# Patient Record
(demographics unavailable — no encounter records)

---
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
## 2024-12-11 NOTE — DISCUSSION/SUMMARY
[Low acute suicide risk] : Low acute suicide risk [No] : No [Not clinically indicated] : Safety Plan completed/updated (for individuals at risk): Not clinically indicated [FreeTextEntry1] : Patient denies suicidal/homicidal ideation and/or intent. [FreeTextEntry3] : Patient advised if suicidal/homicidal ideation/intent emerge he should contact [facility] or call [phone] or present to nearest emergency department or urgicenter.
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
## 2024-12-11 NOTE — PHYSICAL EXAM
[Accelerated] : accelerated [Cooperative] : cooperative [Anxious] : anxious [Labile] : labile [Rapid] : rapid [Pressured] : pressured [Overproductive] : overproductive [Tangential] : tangential [Depressed] : depressed [Preoccupations/Ruminations] : preoccupations/ruminations [None Reported] : none reported [WNL] : within normal limits [Average] : average [Mostly blames others for problems] : Mostly blames others for problems [Mild] : mild [3] : 3 - Obviously apprehensive in face and speech [0] : 0 - Absent [1] : 1 - Mild [2] : 2 - Spontaneously indicates feelings of worthlessness [de-identified] : Patient appears and reports to be anxious and depressed.  His speech is rapid and pressured and often tangential requiring frequent interruptions by writer to get patient back on track. He attributes his problems to "the incident." [FreeTextEntry1] : 23

## 2024-12-11 NOTE — PSYCHOSOCIAL ASSESSMENT
[Use within last 30 days] : use within last 30 days [_____] : Frequency: [unfilled] [Yes (select details below)] : Have you ever experienced this type of event? Yes [had nightmares about the event(s) or thought about the event(s) when you did not want] : had nightmares and/or unwanted thoughts about the events [tried hard not to think about the event(s) or went out of your way to avoid situations that reminded you of the event] : tried hard to avoid thinking about events or avoid situations that reminded patient of the event [has been constantly on guard, watchful, or easily startled] : has been constantly on guard, watchful, or easily startled [felt numb or detached from people, activities, or your surrounding] : has felt numb or detached from people, activities, or surroundings [felt guilty or unable to stop blaming yourself or others for the event(s) or any problems the event(s) may have caused] : has felt guilty or unable to stop blaming self or others for event(s), or any problems the event(s) may have caused [Competitive and integrated employment] : Competitive and integrated employment [15 - 34 hours] : 15 - 34 hours [Financially stable] : financially stable [None] : none [Client's parents (biological, adoptive, stepparent)] : client's parents (biological, adoptive, stepparent) [Yes] : yes [N/A] : n/a [Mother] : mother [Father] : father [Lives with Family Member] : lives with family member [Sibling] : sibling [Good] : good [Frequent Contact] : frequent contact [Yes (add details)] : Patient has/had legal or forensic involvement? Yes [Unknown whether or not the client has a criminal justice or juvenile justice status] : Unknown whether or not the client has a criminal justice or juvenile justice status [No] : No [FreeTextEntry2] : Patient's mother-Aleah Patient's father-Sridhar Patient's brother-Bam all are a source of strength and support. 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

## 2024-12-11 NOTE — DISCUSSION/SUMMARY
[Low acute suicide risk] : Low acute suicide risk [No] : No [Not clinically indicated] : Safety Plan completed/updated (for individuals at risk): Not clinically indicated [FreeTextEntry1] : Patient denies suicidal/homicidal ideation and/or intent. [FreeTextEntry3] : Patient advised if suicidal/homicidal ideation/intent emerge he should contact [facility] or call [phone] or present to nearest emergency department or urgicenter.

## 2024-12-11 NOTE — PLAN
[Admit to Program     (Add Program Admission information to a new column in the Admit/Discharge Flowsheet)] : Admit to program [Every ___ week(s)] : Psychotherapy: Every [unfilled] week(s) [Individual Therapy] : Individual Therapy [FreeTextEntry4] : Treatment Plan for Anxiety Goal: Reduce overall frequency, intensity, and duration of the anxiety (excessive worry, motor tension, rituals, obsessive rumination, autonomic hyperactivity and/or hypervigilance) so that daily functioning is not impaired. Objectives: 1-Describe situations, thoughts, feelings and actions associated with anxiety and worry. 2-Complete psychological instruments designed to assess worry and anxiety symptoms with a focus on reducing obtained scores by 20% or more. 3-Participate in gradual imaginal and/or live exposure to the feared negative consequences predicted by worries and develop reality-based predictions. Methods/Interventions: 1-Ask the patient to describe past experiences of anxiety and the impact of these experiences on functioning via interview. 2-Administer psychological objective measures to help assess the nature and degree of the patient's worry and anxiety and impact on functioning. 3-Direct and assist the patient in constructing a hierarchy of feared or avoided activities and/or thoughts followed by imaginal and/or in vivo exposure to feared situations and/or thoughts.  Treatment Plan for Depression: Goal: Develop healthy thinking patterns and beliefs about self, others and the world that lead to alleviation of depressive symptoms and help prevent relapse of depression. Objectives: 1- Verbalize any history of past and present suicidal ideation and/or intent. 2- Identify and replace thoughts and beliefs that support depression 3- Complete psychological instruments designed to assess depressive symptoms with a focus on reducing obtained scores by 20% or more. Methods: 1-Establish rapport with the patient toward building a strong therapeutic alliance, convey caring, support, warmth and empathy in a safe, non-judgmental environment. 2-Conduct cognitive behavioral therapy, first conveying the connection among cognition, depressive feelings and actions. 3- Administer psychological objective measures to help assess the nature and degree of the patient's depression and impact on functioning. PTSD Goal Eliminate or reduce the negative impact trauma- related symptoms have on social, occupational, and family functioning.  Objectives:  1-Describe in as much detail as comfort allows the experience( s) with trauma and history of PTSD symptoms.  2-Identify, challenge, and replace biased, negative, and self- defeating thoughts resulting from the trauma with reality- based alternatives.  3-Learn and implement guided self- dialog to manage thoughts, feelings, and urges brought on by encounters with trauma- related situations.   Methods:  1-Gently and sensitively explore the client's recollection of the facts of the traumatic incident( s) and his/ her/ their posttraumatic reactions; assess frequency, intensity, duration, and history of the client's PTSD symptoms and their impact on functioning.  2-Assign the client to keep a daily log of automatic thoughts and feelings (or supplement with "Negative Thoughts Trigger Negative Feelings" in the Adult Psychotherapy Homework Planner by Joseph); process the journal material to identify trauma- driven patterns and reality- based alternatives facilitative of posttraumatic growth.  3-Teach the client a guided self- dialogue procedure in which he/ she/ they learn to recognize maladaptive self- talk, challenge its biases, cope with engendered feelings, overcome avoidance, and reinforce accomplishments; review and reinforce progress, problem- solve obstacles toward a sustained goal- oriented approach to daily activities

## 2024-12-11 NOTE — SOCIAL HISTORY
[FreeTextEntry1] : Patient reports that his passion is acting. He loves singing and playing guitar and piano and has recorded 4 albums.  He also enjoys collecting vintage clothing.

## 2024-12-11 NOTE — PAST MEDICAL HISTORY
[FreeTextEntry1] : Patient Dx scoliosis treated with heel insert Dx. Hashimoto's History of infant thumb surgery

## 2024-12-11 NOTE — PHYSICAL EXAM
[Accelerated] : accelerated [Cooperative] : cooperative [Anxious] : anxious [Labile] : labile [Rapid] : rapid [Pressured] : pressured [Overproductive] : overproductive [Tangential] : tangential [Depressed] : depressed [Preoccupations/Ruminations] : preoccupations/ruminations [None Reported] : none reported [WNL] : within normal limits [Average] : average [Mostly blames others for problems] : Mostly blames others for problems [Mild] : mild [3] : 3 - Obviously apprehensive in face and speech [0] : 0 - Absent [1] : 1 - Mild [2] : 2 - Spontaneously indicates feelings of worthlessness [de-identified] : Patient appears and reports to be anxious and depressed.  His speech is rapid and pressured and often tangential requiring frequent interruptions by writer to get patient back on track. He attributes his problems to "the incident." [FreeTextEntry1] : 23

## 2024-12-11 NOTE — PSYCHOSOCIAL ASSESSMENT
[Use within last 30 days] : use within last 30 days [_____] : Frequency: [unfilled] [Yes (select details below)] : Have you ever experienced this type of event? Yes [had nightmares about the event(s) or thought about the event(s) when you did not want] : had nightmares and/or unwanted thoughts about the events [tried hard not to think about the event(s) or went out of your way to avoid situations that reminded you of the event] : tried hard to avoid thinking about events or avoid situations that reminded patient of the event [has been constantly on guard, watchful, or easily startled] : has been constantly on guard, watchful, or easily startled [felt numb or detached from people, activities, or your surrounding] : has felt numb or detached from people, activities, or surroundings [felt guilty or unable to stop blaming yourself or others for the event(s) or any problems the event(s) may have caused] : has felt guilty or unable to stop blaming self or others for event(s), or any problems the event(s) may have caused [Competitive and integrated employment] : Competitive and integrated employment [15 - 34 hours] : 15 - 34 hours [Financially stable] : financially stable [None] : none [Client's parents (biological, adoptive, stepparent)] : client's parents (biological, adoptive, stepparent) [Yes] : yes [N/A] : n/a [Mother] : mother [Father] : father [Lives with Family Member] : lives with family member [Sibling] : sibling [Good] : good [Frequent Contact] : frequent contact [Yes (add details)] : Patient has/had legal or forensic involvement? Yes [Unknown whether or not the client has a criminal justice or juvenile justice status] : Unknown whether or not the client has a criminal justice or juvenile justice status [No] : No [FreeTextEntry2] : Patient's mother-Aleah Patient's father-Sridhar Patient's brother-Bam all are a source of strength and support. 

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
## 2024-12-21 NOTE — PLAN
[Cognitive Processing Therapy] : Cognitive Processing Therapy  [FreeTextEntry2] : Treatment Plan for Anxiety Goal: Reduce overall frequency, intensity, and duration of the anxiety (excessive worry, motor tension, rituals, obsessive rumination, autonomic hyperactivity and/or hypervigilance) so that daily functioning is not impaired. Objectives: 1-Describe situations, thoughts, feelings and actions associated with anxiety and worry. 2-Complete psychological instruments designed to assess worry and anxiety symptoms with a focus on reducing obtained scores by 20% or more. 3-Participate in gradual imaginal and/or live exposure to the feared negative consequences predicted by worries and develop reality-based predictions. Methods/Interventions: 1-Ask the patient to describe past experiences of anxiety and the impact of these experiences on functioning via interview. 2-Administer psychological objective measures to help assess the nature and degree of the patient's worry and anxiety and impact on functioning. 3-Direct and assist the patient in constructing a hierarchy of feared or avoided activities and/or thoughts followed by imaginal and/or in vivo exposure to feared situations and/or thoughts.  Treatment Plan for Depression: Goal: Develop healthy thinking patterns and beliefs about self, others and the world that lead to alleviation of depressive symptoms and help prevent relapse of depression. Objectives: 1- Verbalize any history of past and present suicidal ideation and/or intent. 2- Identify and replace thoughts and beliefs that support depression 3- Complete psychological instruments designed to assess depressive symptoms with a focus on reducing obtained scores by 20% or more. Methods: 1-Establish rapport with the patient toward building a strong therapeutic alliance, convey caring, support, warmth and empathy in a safe, non-judgmental environment. 2-Conduct cognitive behavioral therapy, first conveying the connection among cognition, depressive feelings and actions. 3- Administer psychological objective measures to help assess the nature and degree of the patient's depression and impact on functioning. PTSD Goal Eliminate or reduce the negative impact trauma- related symptoms have on social, occupational, and family functioning.  Objectives:  1-Describe in as much detail as comfort allows the experience( s) with trauma and history of PTSD symptoms.  2-Identify, challenge, and replace biased, negative, and self- defeating thoughts resulting from the trauma with reality- based alternatives.  3-Learn and implement guided self- dialog to manage thoughts, feelings, and urges brought on by encounters with trauma- related situations.  Methods:  1-Gently and sensitively explore the client's recollection of the facts of the traumatic incident( s) and his/ her/ their posttraumatic reactions; assess frequency, intensity, duration, and history of the client's PTSD symptoms and their impact on functioning.  2-Assign the client to keep a daily log of automatic thoughts and feelings (or supplement with "Negative Thoughts Trigger Negative Feelings" in the Adult Psychotherapy Homework Planner by Joseph); process the journal material to identify trauma- driven patterns and reality- based alternatives facilitative of posttraumatic growth.  3-Teach the client a guided self- dialogue procedure in which he/ she/ they learn to recognize maladaptive self- talk, challenge its biases, cope with engendered feelings, overcome avoidance, and reinforce accomplishments; review and reinforce progress, problem- solve obstacles toward a sustained goal- oriented approach to daily activities Psychotherapy: Every 1 week(s)   Types: Individual Therapy [de-identified] : Patient seen in session.  requested tabling discussion of "event" until next session given the fact that patient has final exams after session that he feels and writer agrees would be negatively impacted by revisiting "event."  Patient complained of neck and muscle pain throughout the session causing him to contort in all directions throughout the session.  Patient tangential with AMARILIS and writer had to frequently interrupt to redirect.  Majority of session spent creating patient's genogram.  Genogram revealed familial issues "my father tolerates me," "he doesn't enjoy fun" and "He's always about business."  The patient's parents are together 25+ years and the patient describes their marriage as "dull."  He sees his mother as a "fixer" who is not compassionate.  He feels she has undiagnosed/untreated anxiety and depression.  The patient describes his brother as his "safe person." He feels his borther is also untreated/undiagnosed depression and anxiety. [FreeTextEntry1] : Treatment Plan for Anxiety Goal: Reduce overall frequency, intensity, and duration of the anxiety (excessive worry, motor tension, rituals, obsessive rumination, autonomic hyperactivity and/or hypervigilance) so that daily functioning is not impaired. Objectives: 1-Describe situations, thoughts, feelings and actions associated with anxiety and worry. 2-Complete psychological instruments designed to assess worry and anxiety symptoms with a focus on reducing obtained scores by 20% or more. 3-Participate in gradual imaginal and/or live exposure to the feared negative consequences predicted by worries and develop reality-based predictions. Methods/Interventions: 1-Ask the patient to describe past experiences of anxiety and the impact of these experiences on functioning via interview. 2-Administer psychological objective measures to help assess the nature and degree of the patient's worry and anxiety and impact on functioning. 3-Direct and assist the patient in constructing a hierarchy of feared or avoided activities and/or thoughts followed by imaginal and/or in vivo exposure to feared situations and/or thoughts.  Treatment Plan for Depression: Goal: Develop healthy thinking patterns and beliefs about self, others and the world that lead to alleviation of depressive symptoms and help prevent relapse of depression. Objectives: 1- Verbalize any history of past and present suicidal ideation and/or intent. 2- Identify and replace thoughts and beliefs that support depression 3- Complete psychological instruments designed to assess depressive symptoms with a focus on reducing obtained scores by 20% or more. Methods: 1-Establish rapport with the patient toward building a strong therapeutic alliance, convey caring, support, warmth and empathy in a safe, non-judgmental environment. 2-Conduct cognitive behavioral therapy, first conveying the connection among cognition, depressive feelings and actions. 3- Administer psychological objective measures to help assess the nature and degree of the patient's depression and impact on functioning. PTSD Goal Eliminate or reduce the negative impact trauma- related symptoms have on social, occupational, and family functioning.  Objectives:  1-Describe in as much detail as comfort allows the experience( s) with trauma and history of PTSD symptoms.  2-Identify, challenge, and replace biased, negative, and self- defeating thoughts resulting from the trauma with reality- based alternatives.  3-Learn and implement guided self- dialog to manage thoughts, feelings, and urges brought on by encounters with trauma- related situations.  Methods:  1-Gently and sensitively explore the client's recollection of the facts of the traumatic incident( s) and his/ her/ their posttraumatic reactions; assess frequency, intensity, duration, and history of the client's PTSD symptoms and their impact on functioning.  2-Assign the client to keep a daily log of automatic thoughts and feelings (or supplement with "Negative Thoughts Trigger Negative Feelings" in the Adult Psychotherapy Homework Planner by Joseph); process the journal material to identify trauma- driven patterns and reality- based alternatives facilitative of posttraumatic growth.  3-Teach the client a guided self- dialogue procedure in which he/ she/ they learn to recognize maladaptive self- talk, challenge its biases, cope with engendered feelings, overcome avoidance, and reinforce accomplishments; review and reinforce progress, problem- solve obstacles toward a sustained goal- oriented approach to daily activities Psychotherapy: Every 1 week(s)   Types: Individual Therapy

## 2024-12-21 NOTE — END OF VISIT
[Duration of Psychotherapy Visit (minutes spent in synchronous communication): ____] : Duration of Psychotherapy Visit (minutes spent in synchronous communication): [unfilled] [Individual Psychotherapy for 38-52 minutes] : Individual Psychotherapy for 38 - 52 minutes [Teletherapy Service Provided] : The services provided in this session were delivered via tele-therapy [FreeTextEntry3] : Home [FreeTextEntry5] : [place], [place] [Licensed Clinician] : Licensed Clinician

## 2024-12-21 NOTE — RISK ASSESSMENT
[No, patient denies ideation or behavior] : No, patient denies ideation or behavior [Low acute suicide risk] : Low acute suicide risk [Not clinically indicated] : Safety Plan completed/updated (for individuals at risk): Not clinically indicated [FreeTextEntry9] : patient presents with low risk for suicidal/homicidal ideation and/or intent. [FreeTextEntry3] : Patient advised that should thoughts of suicide or aggression emerge he should contact [facility], call [phone] or present to nearest ED or urgicenter

## 2024-12-21 NOTE — REASON FOR VISIT
[Patient preference] : as per patient preference [Telehealth (audio & video) - Individual/Group] : This visit was provided via telehealth using real-time 2-way audio visual technology. [Medical Office: ([facility])___] : The provider was located at the medical office in [unfilled]. [Home] : The patient, [unfilled], was located at home, [unfilled], at the time of the visit. [Patient] : Patient [FreeTextEntry4] : 9:00 AM [FreeTextEntry5] : 9:45 AM [FreeTextEntry1] :  Patient reports that he is experiencing symptoms consistent with a Dx of PTSD including, but not limited to, re-experiencing, avoidance, panic, hypervigilance, transient paranoia, and depression. Previously the patient was diagnosed with what he refers to as "raging ADHD."

## 2024-12-21 NOTE — PHYSICAL EXAM
[Unkept] : unkept [Avoidant] : avoidant [Accelerated] : accelerated [Stereotyped/Peculiar] : stereotyped/peculiar [Cooperative] : cooperative [Depressed] : depressed [Anxious] : anxious [Rapid] : rapid [Overproductive] : overproductive [Tangential] : tangential [Flight of ideas] : flight of ideas [None] : none [None Reported] : none reported [Average] : average [WNL] : within normal limits [3] : 3 - Moderate [2] : Rated by History: 2 - Definite weight loss [0] : 0 - Absent [1] : 1 - Indicates feelings of worthlessness (loss of self-esteem) only on questioning [de-identified] : Patient present with rapid, tangential speech along with reported and observed anxious and depressive symptoms/ [FreeTextEntry1] : 23

## 2025-02-03 NOTE — REASON FOR VISIT
[Medical Office: ([facility])___] : The provider was located at the medical office in [unfilled]. [Other Location: e.g. Home (Enter Location, City,State)___] : The patient, [unfilled], was located at [unfilled] at the time of the visit. [Patient] : Patient [FreeTextEntry4] : 11:00 AM [FreeTextEntry5] : 11:55 AM [FreeTextEntry1] :  Patient reports that he is experiencing symptoms consistent with a Dx of PTSD including, but not limited to, re-experiencing, avoidance, panic, hypervigilance, transient paranoia, and depression. Previously the patient was diagnosed with what he refers to as "raging ADHD."
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
## 2025-02-03 NOTE — PHYSICAL EXAM
[Unkept] : unkept [Accelerated] : accelerated [Stereotyped/Peculiar] : stereotyped/peculiar [Cooperative] : cooperative [Depressed] : depressed [Anxious] : anxious [Rapid] : rapid [Overproductive] : overproductive [Tangential] : tangential [Flight of ideas] : flight of ideas [None] : none [None Reported] : none reported [WNL] : within normal limits [3] : 3 - Moderate [2] : Rated by History: 2 - Definite weight loss [Average] : average [1] : 1 - Mildly preoccupied with bodily functions and physical symptoms [0] : 0 - Not present [de-identified] : Patient present with rapid, tangential speech along with reported and observed anxious and depressed mood. [FreeTextEntry1] : 18

## 2025-02-03 NOTE — END OF VISIT
[Duration of Psychotherapy Visit (minutes spent in synchronous communication): ____] : Duration of Psychotherapy Visit (minutes spent in synchronous communication): [unfilled] [Individual Psychotherapy for 53+ minutes] : Individual Psychotherapy for 53+ minutes  [FreeTextEntry3] : MEET [FreeTextEntry5] : MEET [Licensed Clinician] : Licensed Clinician
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
## 2025-02-03 NOTE — RISK ASSESSMENT
[No, patient denies ideation or behavior] : No, patient denies ideation or behavior [Low acute suicide risk] : Low acute suicide risk [Not clinically indicated] : Safety Plan completed/updated (for individuals at risk): Not clinically indicated [FreeTextEntry9] : patient presents with low risk for suicidal/homicidal ideation and/or intent. [FreeTextEntry3] : Patient advised that should thoughts of suicide or aggression emerge he should contact [facility], call [phone] or present to nearest ED or urgicenter

## 2025-02-04 NOTE — END OF VISIT
[FreeTextEntry3] : I, Dr. Moulton personally performed the evaluation and management (E/M) services including all necessary procedures, for this new patient. That E/M includes conducting the clinically appropriate initial history &/or exam, assessing all conditions, and establishing the plan of care. Today, my PATRIC, Eliana Jain, was here to observe &/or participate in the visit & follow plan of care established by me.

## 2025-02-04 NOTE — ASSESSMENT
[FreeTextEntry1] : Patient 22-year-old placed tubes in his ears as a child comes in with history of recent sialoadenitis treated with antibiotics and drained a lot of mucus from his sinuses which complains of frontal headaches as well.  He also has he is micrognathia can has some TMJ related issues for which I referred him to one of our oral oral maxillofacial surgeons for an evaluation because of his sinus issues we sent her for a CAT scan to definitively evaluate his sinuses determine if any additional intervention will be indicated endoscopically he has a mildly deviated septum but no evidence of any tumors masses or polyps are present.  He will continue to suck on lemon drops and stay hydrated to him minimize the chance of recurrent parotitis.

## 2025-02-04 NOTE — HISTORY OF PRESENT ILLNESS
[de-identified] : Patient comes in with reported TMJ and recent parotitis that he thinks is causing facial pain and pressure. He had swelling in his face and a lot of pain in the cheeks since. He has been on antibiotics for the parotitis, currently on 10th day. However, he is having worsening of facial pain and tightness. He feels facial pain in the cheeks, radiating from the jaw all day for the last few weeks.

## 2025-02-04 NOTE — CONSULT LETTER
.Refill Request     CONFIRM preferred pharmacy with the patient. If Mail Order Rx - Pend for 90 day refill. Last Seen: Last Seen Department: 5/1/2023  Last Seen by PCP: 5/1/2023    Last Written: 5/1/23 180 with 1    If no future appointment scheduled:  Review the last OV with PCP and review information for follow-up visit,  Route STAFF MESSAGE with patient name to the [facility] for scheduling with the following information:            -  Timing of next visit           -  Visit type ie Physical, OV, etc           -  Diagnoses/Reason ie. COPD, HTN - Do not use MEDICATION, Follow-up or CHECK UP - Give reason for visit      Next Appointment:   Future Appointments   Date Time Provider Mariana Brito   8/1/2023  3:30 PM Aston Mejias MD [facility] [place]       Message sent to [employer] to schedule appt with patient?   N/A      Requested Prescriptions     Pending Prescriptions Disp Refills    gabapentin (NEURONTIN) 100 MG capsule [Pharmacy Med Name: Gabapentin 100 MG Oral Capsule] 180 capsule 0     Sig: TAKE 2 CAPSULES BY MOUTH THREE TIMES DAILY [Dear  ___] : Dear  [unfilled], [Consult Letter:] : I had the pleasure of evaluating your patient, [unfilled]. [Please see my note below.] : Please see my note below. [Consult Closing:] : Thank you very much for allowing me to participate in the care of this patient.  If you have any questions, please do not hesitate to contact me. [Sincerely,] : Sincerely, [FreeTextEntry3] : Josse Moulton MD [facility] Associated Professor, [place]

## 2025-02-06 NOTE — PLAN
[Medication education provided] : Medication education provided. [FreeTextEntry5] : No acute safety concerns. Continue current medications: Vyvanze 60mg daily  Remeron 30mg qHS propranolol 20mg daily prn anxiety Continue individual psychotherapy. Continue psych follow up at [facility]. Will discuss with team re: appropriate long term psych follow up care.

## 2025-02-06 NOTE — END OF VISIT
[Duration of Psychotherapy Visit (minutes spent in synchronous communication): ____] : Duration of Psychotherapy Visit (minutes spent in synchronous communication): [unfilled] [Individual Psychotherapy for 38-52 minutes] : Individual Psychotherapy for 38 - 52 minutes [Teletherapy Service Provided] : The services provided in this session were delivered via tele-therapy [FreeTextEntry3] : Home [FreeTextEntry5] : [place], [place] [Licensed Clinician] : Licensed Clinician
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
## 2025-02-06 NOTE — RISK ASSESSMENT
[No, patient denies ideation or behavior] : No, patient denies ideation or behavior [Low acute suicide risk] : Low acute suicide risk [Not clinically indicated] : Safety Plan completed/updated (for individuals at risk): Not clinically indicated [FreeTextEntry9] : patient presents with low risk for suicidal/homicidal ideation and/or intent. [FreeTextEntry3] : Patient advised that should thoughts of suicide or aggression emerge he should contact [facility], call [phone] or present to nearest ED or urgicenter

## 2025-02-06 NOTE — REASON FOR VISIT
[FreeTextEntry1] : Looking to transfer psych care to [facility]

## 2025-02-06 NOTE — SOCIAL HISTORY
[FreeTextEntry1] : Single. No children. Lives with parents. Finished high school. In college at [employer] for bachelors in Criminal Justice. 

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
## 2025-02-06 NOTE — REASON FOR VISIT
[Patient preference] : as per patient preference [Telehealth (audio & video) - Individual/Group] : This visit was provided via telehealth using real-time 2-way audio visual technology. [Other Location: e.g. Home (Enter Location, City,State)___] : The provider was located at [unfilled]. [Home] : The patient, [unfilled], was located at home, [unfilled], at the time of the visit. [Patient] : Patient [FreeTextEntry4] : 3:00 PM [FreeTextEntry5] : 3:45 PM [FreeTextEntry1] :  Patient reports that he is experiencing symptoms consistent with a Dx of PTSD including, but not limited to, re-experiencing, avoidance, panic, hypervigilance, transient paranoia, and depression. Previously the patient was diagnosed with what he refers to as "raging ADHD."

## 2025-02-06 NOTE — PHYSICAL EXAM
[Unkept] : unkept [Accelerated] : accelerated [Stereotyped/Peculiar] : stereotyped/peculiar [Cooperative] : cooperative [Depressed] : depressed [Anxious] : anxious [Rapid] : rapid [Overproductive] : overproductive [Tangential] : tangential [Flight of ideas] : flight of ideas [None] : none [None Reported] : none reported [Average] : average [WNL] : within normal limits [3] : 3 - Obviously apprehensive in face and speech [0] : 0 - Not present [Full] : full [de-identified] : Patient present with rapid, tangential speech along with reported and observed anxious and depressed mood. [1] : 1 - Fidgety. Clenching fists or chair arm, kicking feet [2] : 2 - Mild [FreeTextEntry1] : 14
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
## 2025-02-15 NOTE — RISK ASSESSMENT
[No, patient denies ideation or behavior] : No, patient denies ideation or behavior [Low acute suicide risk] : Low acute suicide risk [Not clinically indicated] : Safety Plan completed/updated (for individuals at risk): Not clinically indicated [FreeTextEntry9] : patient presents with low risk for suicidal/homicidal ideation and/or intent. [FreeTextEntry3] : Patient advised that should thoughts of suicide or aggression emerge he should contact [facility], call [phone] or present to nearest ED or urgicenter
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
## 2025-02-15 NOTE — REASON FOR VISIT
[Medical Office: ([facility])___] : The provider was located at the medical office in [unfilled]. [Other Location: e.g. Home (Enter Location, City,State)___] : The patient, [unfilled], was located at [unfilled] at the time of the visit. [Patient] : Patient [FreeTextEntry4] : 3:00 PM [FreeTextEntry5] : 3:55 PM [FreeTextEntry1] :  Patient reports that he is experiencing symptoms consistent with a Dx of PTSD including, but not limited to, re-experiencing, avoidance, panic, hypervigilance, transient paranoia, and depression. Previously the patient was diagnosed with what he refers to as "raging ADHD."
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
## 2025-02-15 NOTE — PHYSICAL EXAM
[Unkept] : unkept [Accelerated] : accelerated [Stereotyped/Peculiar] : stereotyped/peculiar [Cooperative] : cooperative [Depressed] : depressed [Anxious] : anxious [Full] : full [Rapid] : rapid [Overproductive] : overproductive [Tangential] : tangential [Flight of ideas] : flight of ideas [None] : none [None Reported] : none reported [Average] : average [WNL] : within normal limits [1] : 1 - Mildly preoccupied with bodily functions and physical symptoms [0] : 0 - Not present [de-identified] : Patient present with rapid, tangential speech along with reported and observed anxious and depressed mood. [2] : 2 - Spontaneously expresses discomfort, worries over trivia [FreeTextEntry1] : 14
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
## 2025-03-03 NOTE — REASON FOR VISIT
[Patient preference] : as per patient preference [Telehealth (audio & video) - Individual/Group] : This visit was provided via telehealth using real-time 2-way audio visual technology. [Medical Office: ([facility])___] : The provider was located at the medical office in [unfilled]. [Home] : The patient, [unfilled], was located at home, [unfilled], at the time of the visit. [Patient] : Patient [FreeTextEntry4] : 1:00 PM [FreeTextEntry5] : 1:45 PM [FreeTextEntry1] :  Patient reports that he is experiencing symptoms consistent with a Dx of PTSD including, but not limited to, re-experiencing, avoidance, panic, hypervigilance, transient paranoia, and depression. Previously the patient was diagnosed with what he refers to as "raging ADHD."

## 2025-03-03 NOTE — RISK ASSESSMENT
[No, patient denies ideation or behavior] : No, patient denies ideation or behavior [Low acute suicide risk] : Low acute suicide risk [Not clinically indicated] : Safety Plan completed/updated (for individuals at risk): Not clinically indicated [FreeTextEntry9] : patient presents with low risk for suicidal/homicidal ideation and/or intent. [FreeTextEntry3] : Patient advised that should thoughts of suicide or aggression emerge he should contact [facility], call [phone] or present to nearest ED or urgicenter

## 2025-03-03 NOTE — PHYSICAL EXAM
[Unkept] : unkept [Stereotyped/Peculiar] : stereotyped/peculiar [Cooperative] : cooperative [Depressed] : depressed [Anxious] : anxious [Constricted] : constricted [Rapid] : rapid [Overproductive] : overproductive [Tangential] : tangential [Flight of ideas] : flight of ideas [None] : none [None Reported] : none reported [Average] : average [WNL] : within normal limits [3] : 3 - Obviously apprehensive in face and speech [1] : 1 - Eats spontaneously but without relish or pleasure [2] : Rated by History: 2 - Definite weight loss [0] : 0 - Not present [de-identified] : Patient present with rapid, tangential speech along with reported and observed anxious and depressed mood. [FreeTextEntry1] : 17

## 2025-03-03 NOTE — END OF VISIT
[Duration of Psychotherapy Visit (minutes spent in synchronous communication): ____] : Duration of Psychotherapy Visit (minutes spent in synchronous communication): [unfilled] [Individual Psychotherapy for 38-52 minutes] : Individual Psychotherapy for 38 - 52 minutes [Teletherapy Service Provided] : The services provided in this session were delivered via tele-therapy [FreeTextEntry3] : Home [FreeTextEntry5] : MEET [Licensed Clinician] : Licensed Clinician

## 2025-03-07 NOTE — RISK ASSESSMENT
[No, patient denies ideation or behavior] : No, patient denies ideation or behavior [Low acute suicide risk] : Low acute suicide risk [Not clinically indicated] : Safety Plan completed/updated (for individuals at risk): Not clinically indicated [FreeTextEntry9] : patient presents with low risk for suicidal/homicidal ideation and/or intent. [FreeTextEntry3] : Patient advised that should thoughts of suicide or aggression emerge he should contact [facility], call [phone] or present to nearest ED or urgicenter

## 2025-03-07 NOTE — PLAN
[Recommended Frequency of Visits: ____] : Recommended frequency of visits: [unfilled] [Return in ____ week(s)] : Return in [unfilled] week(s) [FreeTextEntry2] : Treatment Plan for Anxiety Goal: Reduce overall frequency, intensity, and duration of the anxiety (excessive worry, motor tension, rituals, obsessive rumination, autonomic hyperactivity and/or hypervigilance) so that daily functioning is not impaired. Objectives: 1-Describe situations, thoughts, feelings and actions associated with anxiety and worry. 2-Complete psychological instruments designed to assess worry and anxiety symptoms with a focus on reducing obtained scores by 20% or more. 3-Participate in gradual imaginal and/or live exposure to the feared negative consequences predicted by worries and develop reality-based predictions. Methods/Interventions: 1-Ask the patient to describe past experiences of anxiety and the impact of these experiences on functioning via interview. 2-Administer psychological objective measures to help assess the nature and degree of the patient's worry and anxiety and impact on functioning. 3-Direct and assist the patient in constructing a hierarchy of feared or avoided activities and/or thoughts followed by imaginal and/or in vivo exposure to feared situations and/or thoughts.  Treatment Plan for Depression: Goal: Develop healthy thinking patterns and beliefs about self, others and the world that lead to alleviation of depressive symptoms and help prevent relapse of depression. Objectives: 1- Verbalize any history of past and present suicidal ideation and/or intent. 2- Identify and replace thoughts and beliefs that support depression 3- Complete psychological instruments designed to assess depressive symptoms with a focus on reducing obtained scores by 20% or more. Methods: 1-Establish rapport with the patient toward building a strong therapeutic alliance, convey caring, support, warmth and empathy in a safe, non-judgmental environment. 2-Conduct cognitive behavioral therapy, first conveying the connection among cognition, depressive feelings and actions. 3- Administer psychological objective measures to help assess the nature and degree of the patient's depression and impact on functioning. PTSD Goal Eliminate or reduce the negative impact trauma- related symptoms have on social, occupational, and family functioning.  Objectives:  1-Describe in as much detail as comfort allows the experience( s) with trauma and history of PTSD symptoms.  2-Identify, challenge, and replace biased, negative, and self- defeating thoughts resulting from the trauma with reality- based alternatives.  3-Learn and implement guided self- dialog to manage thoughts, feelings, and urges brought on by encounters with trauma- related situations.  Methods:  1-Gently and sensitively explore the client's recollection of the facts of the traumatic incident( s) and his/ her/ their posttraumatic reactions; assess frequency, intensity, duration, and history of the client's PTSD symptoms and their impact on functioning.  2-Assign the client to keep a daily log of automatic thoughts and feelings (or supplement with "Negative Thoughts Trigger Negative Feelings" in the Adult Psychotherapy Homework Planner by Joseph); process the journal material to identify trauma- driven patterns and reality- based alternatives facilitative of posttraumatic growth.  3-Teach the client a guided self- dialogue procedure in which he/ she/ they learn to recognize maladaptive self- talk, challenge its biases, cope with engendered feelings, overcome avoidance, and reinforce accomplishments; review and reinforce progress, problem- solve obstacles toward a sustained goal- oriented approach to daily activities Psychotherapy: Every 1 week(s)   Types: Individual Therapy [Cognitive and/or Behavior Therapy] : Cognitive and/or Behavior Therapy  [Cognitive Processing Therapy] : Cognitive Processing Therapy  [de-identified] : Patient seen in session.  Reports decrease in Remeron toward tapering off and resulting decrease in pain and anxiety.  However, he reports a simultaneous increase in depression.  Results of Genesight testing revealed limited efficacy of SSRI's. He will continue with Vyvanse and Inderal.  Patient acknowledged belief he is " on the spectrum" as he experiences texture, and other sensitivities including symmetry.  He continues with somatic complaints and contortions and repots he is starting today with a "somatic therapist." Patient discussed being overanalytical as well as hypersensitive. [FreeTextEntry1] : Treatment Plan for Anxiety Goal: Reduce overall frequency, intensity, and duration of the anxiety (excessive worry, motor tension, rituals, obsessive rumination, autonomic hyperactivity and/or hypervigilance) so that daily functioning is not impaired. Objectives: 1-Describe situations, thoughts, feelings and actions associated with anxiety and worry. 2-Complete psychological instruments designed to assess worry and anxiety symptoms with a focus on reducing obtained scores by 20% or more. 3-Participate in gradual imaginal and/or live exposure to the feared negative consequences predicted by worries and develop reality-based predictions. Methods/Interventions: 1-Ask the patient to describe past experiences of anxiety and the impact of these experiences on functioning via interview. 2-Administer psychological objective measures to help assess the nature and degree of the patient's worry and anxiety and impact on functioning. 3-Direct and assist the patient in constructing a hierarchy of feared or avoided activities and/or thoughts followed by imaginal and/or in vivo exposure to feared situations and/or thoughts.  Treatment Plan for Depression: Goal: Develop healthy thinking patterns and beliefs about self, others and the world that lead to alleviation of depressive symptoms and help prevent relapse of depression. Objectives: 1- Verbalize any history of past and present suicidal ideation and/or intent. 2- Identify and replace thoughts and beliefs that support depression 3- Complete psychological instruments designed to assess depressive symptoms with a focus on reducing obtained scores by 20% or more. Methods: 1-Establish rapport with the patient toward building a strong therapeutic alliance, convey caring, support, warmth and empathy in a safe, non-judgmental environment. 2-Conduct cognitive behavioral therapy, first conveying the connection among cognition, depressive feelings and actions. 3- Administer psychological objective measures to help assess the nature and degree of the patient's depression and impact on functioning. PTSD Goal Eliminate or reduce the negative impact trauma- related symptoms have on social, occupational, and family functioning.  Objectives:  1-Describe in as much detail as comfort allows the experience( s) with trauma and history of PTSD symptoms.  2-Identify, challenge, and replace biased, negative, and self- defeating thoughts resulting from the trauma with reality- based alternatives.  3-Learn and implement guided self- dialog to manage thoughts, feelings, and urges brought on by encounters with trauma- related situations.  Methods:  1-Gently and sensitively explore the client's recollection of the facts of the traumatic incident( s) and his/ her/ their posttraumatic reactions; assess frequency, intensity, duration, and history of the client's PTSD symptoms and their impact on functioning.  2-Assign the client to keep a daily log of automatic thoughts and feelings (or supplement with "Negative Thoughts Trigger Negative Feelings" in the Adult Psychotherapy Homework Planner by Joseph); process the journal material to identify trauma- driven patterns and reality- based alternatives facilitative of posttraumatic growth.  3-Teach the client a guided self- dialogue procedure in which he/ she/ they learn to recognize maladaptive self- talk, challenge its biases, cope with engendered feelings, overcome avoidance, and reinforce accomplishments; review and reinforce progress, problem- solve obstacles toward a sustained goal- oriented approach to daily activities Psychotherapy: Every 1 week(s)   Types: Individual Therapy
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
## 2025-03-07 NOTE — REASON FOR VISIT
[Patient preference] : as per patient preference [Telehealth (audio & video) - Individual/Group] : This visit was provided via telehealth using real-time 2-way audio visual technology. [Medical Office: ([facility])___] : The provider was located at the medical office in [unfilled]. [Home] : The patient, [unfilled], was located at home, [unfilled], at the time of the visit. [Patient] : Patient [FreeTextEntry4] : 3:00 PM [FreeTextEntry5] : 3:45 PM [FreeTextEntry1] :  Patient reports that he is experiencing symptoms consistent with a Dx of PTSD including, but not limited to, re-experiencing, avoidance, panic, hypervigilance, transient paranoia, and depression. Previously the patient was diagnosed with what he refers to as "raging ADHD."

## 2025-03-07 NOTE — PHYSICAL EXAM
[Unkept] : unkept [Stereotyped/Peculiar] : stereotyped/peculiar [Cooperative] : cooperative [Depressed] : depressed [Anxious] : anxious [Constricted] : constricted [Rapid] : rapid [Overproductive] : overproductive [Tangential] : tangential [Flight of ideas] : flight of ideas [None] : none [None Reported] : none reported [Average] : average [WNL] : within normal limits [1] : 1 - Eats spontaneously but without relish or pleasure [0] : 0 - Not present [de-identified] : Patient present with rapid, tangential speech along with reported and observed anxious and depressed mood. [2] : 2 - Spontaneously expresses discomfort, worries over trivia [FreeTextEntry1] : 16

## 2025-03-19 NOTE — PLAN
[FreeTextEntry2] : Treatment Plan for Anxiety Goal: Reduce overall frequency, intensity, and duration of the anxiety (excessive worry, motor tension, rituals, obsessive rumination, autonomic hyperactivity and/or hypervigilance) so that daily functioning is not impaired. Objectives: 1-Describe situations, thoughts, feelings and actions associated with anxiety and worry. 2-Complete psychological instruments designed to assess worry and anxiety symptoms with a focus on reducing obtained scores by 20% or more. 3-Participate in gradual imaginal and/or live exposure to the feared negative consequences predicted by worries and develop reality-based predictions. Methods/Interventions: 1-Ask the patient to describe past experiences of anxiety and the impact of these experiences on functioning via interview. 2-Administer psychological objective measures to help assess the nature and degree of the patient's worry and anxiety and impact on functioning. 3-Direct and assist the patient in constructing a hierarchy of feared or avoided activities and/or thoughts followed by imaginal and/or in vivo exposure to feared situations and/or thoughts.  Treatment Plan for Depression: Goal: Develop healthy thinking patterns and beliefs about self, others and the world that lead to alleviation of depressive symptoms and help prevent relapse of depression. Objectives: 1- Verbalize any history of past and present suicidal ideation and/or intent. 2- Identify and replace thoughts and beliefs that support depression 3- Complete psychological instruments designed to assess depressive symptoms with a focus on reducing obtained scores by 20% or more. Methods: 1-Establish rapport with the patient toward building a strong therapeutic alliance, convey caring, support, warmth and empathy in a safe, non-judgmental environment. 2-Conduct cognitive behavioral therapy, first conveying the connection among cognition, depressive feelings and actions. 3- Administer psychological objective measures to help assess the nature and degree of the patient's depression and impact on functioning. PTSD Goal Eliminate or reduce the negative impact trauma- related symptoms have on social, occupational, and family functioning.  Objectives:  1-Describe in as much detail as comfort allows the experience( s) with trauma and history of PTSD symptoms.  2-Identify, challenge, and replace biased, negative, and self- defeating thoughts resulting from the trauma with reality- based alternatives.  3-Learn and implement guided self- dialog to manage thoughts, feelings, and urges brought on by encounters with trauma- related situations.  Methods:  1-Gently and sensitively explore the client's recollection of the facts of the traumatic incident( s) and his/ her/ their posttraumatic reactions; assess frequency, intensity, duration, and history of the client's PTSD symptoms and their impact on functioning.  2-Assign the client to keep a daily log of automatic thoughts and feelings (or supplement with "Negative Thoughts Trigger Negative Feelings" in the Adult Psychotherapy Homework Planner by Joseph); process the journal material to identify trauma- driven patterns and reality- based alternatives facilitative of posttraumatic growth.  3-Teach the client a guided self- dialogue procedure in which he/ she/ they learn to recognize maladaptive self- talk, challenge its biases, cope with engendered feelings, overcome avoidance, and reinforce accomplishments; review and reinforce progress, problem- solve obstacles toward a sustained goal- oriented approach to daily activities Psychotherapy: Every 1 week(s)   Types: Individual Therapy [Cognitive Processing Therapy] : Cognitive Processing Therapy  [de-identified] : Patient seen in session.  Sobbing and writhing in pain which is of unknown etiology despite negative ct scans and physical/dental evals.  Complaining of jaw misalignment, muscle pain and bone pain. Has seen "somatic therapist" with little relief.  Plans on continuing with her as his chiropractor told him there is nothing else he can do for him.  Despite this, the patient reports he has worked and attended school without interruption.  Writer also provided patient with OP psychiatrist contact info for medication follow-up and explained to patient the nature of trauma focused treatment and need for consistency.  Session ended early due to patient's inability to participate in a meaningful way. [FreeTextEntry1] : Treatment Plan for Anxiety Goal: Reduce overall frequency, intensity, and duration of the anxiety (excessive worry, motor tension, rituals, obsessive rumination, autonomic hyperactivity and/or hypervigilance) so that daily functioning is not impaired. Objectives: 1-Describe situations, thoughts, feelings and actions associated with anxiety and worry. 2-Complete psychological instruments designed to assess worry and anxiety symptoms with a focus on reducing obtained scores by 20% or more. 3-Participate in gradual imaginal and/or live exposure to the feared negative consequences predicted by worries and develop reality-based predictions. Methods/Interventions: 1-Ask the patient to describe past experiences of anxiety and the impact of these experiences on functioning via interview. 2-Administer psychological objective measures to help assess the nature and degree of the patient's worry and anxiety and impact on functioning. 3-Direct and assist the patient in constructing a hierarchy of feared or avoided activities and/or thoughts followed by imaginal and/or in vivo exposure to feared situations and/or thoughts.  Treatment Plan for Depression: Goal: Develop healthy thinking patterns and beliefs about self, others and the world that lead to alleviation of depressive symptoms and help prevent relapse of depression. Objectives: 1- Verbalize any history of past and present suicidal ideation and/or intent. 2- Identify and replace thoughts and beliefs that support depression 3- Complete psychological instruments designed to assess depressive symptoms with a focus on reducing obtained scores by 20% or more. Methods: 1-Establish rapport with the patient toward building a strong therapeutic alliance, convey caring, support, warmth and empathy in a safe, non-judgmental environment. 2-Conduct cognitive behavioral therapy, first conveying the connection among cognition, depressive feelings and actions. 3- Administer psychological objective measures to help assess the nature and degree of the patient's depression and impact on functioning. PTSD Goal Eliminate or reduce the negative impact trauma- related symptoms have on social, occupational, and family functioning.  Objectives:  1-Describe in as much detail as comfort allows the experience( s) with trauma and history of PTSD symptoms.  2-Identify, challenge, and replace biased, negative, and self- defeating thoughts resulting from the trauma with reality- based alternatives.  3-Learn and implement guided self- dialog to manage thoughts, feelings, and urges brought on by encounters with trauma- related situations.  Methods:  1-Gently and sensitively explore the client's recollection of the facts of the traumatic incident( s) and his/ her/ their posttraumatic reactions; assess frequency, intensity, duration, and history of the client's PTSD symptoms and their impact on functioning.  2-Assign the client to keep a daily log of automatic thoughts and feelings (or supplement with "Negative Thoughts Trigger Negative Feelings" in the Adult Psychotherapy Homework Planner by Joseph); process the journal material to identify trauma- driven patterns and reality- based alternatives facilitative of posttraumatic growth.  3-Teach the client a guided self- dialogue procedure in which he/ she/ they learn to recognize maladaptive self- talk, challenge its biases, cope with engendered feelings, overcome avoidance, and reinforce accomplishments; review and reinforce progress, problem- solve obstacles toward a sustained goal- oriented approach to daily activities Psychotherapy: Every 1 week(s)   Types: Individual Therapy

## 2025-03-19 NOTE — PHYSICAL EXAM
[Disheveled] : disheveled [Rapid] : rapid [Preoccupations/Ruminations] : preoccupations/ruminations [de-identified] : somatic [de-identified] : Patient present with tangential speech along with reported and observed anxious and depressed mood. Tearful with dystonic movements and complaints of pain. [4] : 4 - Severe [2] : 2 - Patient volunteers his/her helpless feelings [3] : 3 - Expresses feelings of discouragement, despair, pessimism about future, which can be dispelled [FreeTextEntry1] : 28

## 2025-03-19 NOTE — END OF VISIT
[Duration of Psychotherapy Visit (minutes spent in synchronous communication): ____] : Duration of Psychotherapy Visit (minutes spent in synchronous communication): [unfilled] [Individual Psychotherapy for 16-37 minutes] : Individual Psychotherapy for 16-37 minutes [Teletherapy Service Provided] : The services provided in this session were delivered via tele-therapy [FreeTextEntry3] : Home [FreeTextEntry5] : [place], [place] [Licensed Clinician] : Licensed Clinician

## 2025-03-19 NOTE — RISK ASSESSMENT
[FreeTextEntry9] : patient presents with low risk for suicidal/homicidal ideation and/or intent. [FreeTextEntry3] : Patient advised that should thoughts of suicide or aggression emerge he should contact [facility], call [phone] or present to nearest ED or urgicenter

## 2025-03-19 NOTE — REASON FOR VISIT
[FreeTextEntry4] : 5:00 PM [FreeTextEntry5] : 5:30 PM [FreeTextEntry1] :  Patient reports that he is experiencing symptoms consistent with a Dx of PTSD including, but not limited to, re-experiencing, avoidance, panic, hypervigilance, transient paranoia, and depression. Previously the patient was diagnosed with what he refers to as "raging ADHD."
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
## 2025-04-01 NOTE — END OF VISIT
[Duration of Psychotherapy Visit (minutes spent in synchronous communication): ____] : Duration of Psychotherapy Visit (minutes spent in synchronous communication): [unfilled] [Individual Psychotherapy for 38-52 minutes] : Individual Psychotherapy for 38 - 52 minutes [Teletherapy Service Provided] : The services provided in this session were delivered via tele-therapy [FreeTextEntry3] : Home [FreeTextEntry5] : [place], [place] [Licensed Clinician] : Licensed Clinician

## 2025-04-01 NOTE — PLAN
[Recommended Frequency of Visits: ____] : Recommended frequency of visits: [unfilled] [Return in ____ week(s)] : Return in [unfilled] week(s) [FreeTextEntry2] : Treatment Plan for Anxiety Goal: Reduce overall frequency, intensity, and duration of the anxiety (excessive worry, motor tension, rituals, obsessive rumination, autonomic hyperactivity and/or hypervigilance) so that daily functioning is not impaired. Objectives: 1-Describe situations, thoughts, feelings and actions associated with anxiety and worry. 2-Complete psychological instruments designed to assess worry and anxiety symptoms with a focus on reducing obtained scores by 20% or more. 3-Participate in gradual imaginal and/or live exposure to the feared negative consequences predicted by worries and develop reality-based predictions. Methods/Interventions: 1-Ask the patient to describe past experiences of anxiety and the impact of these experiences on functioning via interview. 2-Administer psychological objective measures to help assess the nature and degree of the patient's worry and anxiety and impact on functioning. 3-Direct and assist the patient in constructing a hierarchy of feared or avoided activities and/or thoughts followed by imaginal and/or in vivo exposure to feared situations and/or thoughts.  Treatment Plan for Depression: Goal: Develop healthy thinking patterns and beliefs about self, others and the world that lead to alleviation of depressive symptoms and help prevent relapse of depression. Objectives: 1- Verbalize any history of past and present suicidal ideation and/or intent. 2- Identify and replace thoughts and beliefs that support depression 3- Complete psychological instruments designed to assess depressive symptoms with a focus on reducing obtained scores by 20% or more. Methods: 1-Establish rapport with the patient toward building a strong therapeutic alliance, convey caring, support, warmth and empathy in a safe, non-judgmental environment. 2-Conduct cognitive behavioral therapy, first conveying the connection among cognition, depressive feelings and actions. 3- Administer psychological objective measures to help assess the nature and degree of the patient's depression and impact on functioning. PTSD Goal Eliminate or reduce the negative impact trauma- related symptoms have on social, occupational, and family functioning.  Objectives:  1-Describe in as much detail as comfort allows the experience( s) with trauma and history of PTSD symptoms.  2-Identify, challenge, and replace biased, negative, and self- defeating thoughts resulting from the trauma with reality- based alternatives.  3-Learn and implement guided self- dialog to manage thoughts, feelings, and urges brought on by encounters with trauma- related situations.  Methods:  1-Gently and sensitively explore the client's recollection of the facts of the traumatic incident( s) and his/ her/ their posttraumatic reactions; assess frequency, intensity, duration, and history of the client's PTSD symptoms and their impact on functioning.  2-Assign the client to keep a daily log of automatic thoughts and feelings (or supplement with "Negative Thoughts Trigger Negative Feelings" in the Adult Psychotherapy Homework Planner by Joseph); process the journal material to identify trauma- driven patterns and reality- based alternatives facilitative of posttraumatic growth.  3-Teach the client a guided self- dialogue procedure in which he/ she/ they learn to recognize maladaptive self- talk, challenge its biases, cope with engendered feelings, overcome avoidance, and reinforce accomplishments; review and reinforce progress, problem- solve obstacles toward a sustained goal- oriented approach to daily activities Psychotherapy: Every 1 week(s)   Types: Individual Therapy [Cognitive Processing Therapy] : Cognitive Processing Therapy  [de-identified] : Patient seen in session.  Slight improvement in mood and dystonic movements.  However, he remains somatically pre-occupied.  He spent the session discussing an incident at work where he was told by a supervisor "you need counseling for your behavior."  This escalated into what he describes as a misunderstanding in which his supervisor accused him of resigning which the patient denies.  He was subsequently interviewed by HR.  The patient reports this incident was a triggering event causing him to re-experience his index trauma while at [facility].   [FreeTextEntry1] : Treatment Plan for Anxiety Goal: Reduce overall frequency, intensity, and duration of the anxiety (excessive worry, motor tension, rituals, obsessive rumination, autonomic hyperactivity and/or hypervigilance) so that daily functioning is not impaired. Objectives: 1-Describe situations, thoughts, feelings and actions associated with anxiety and worry. 2-Complete psychological instruments designed to assess worry and anxiety symptoms with a focus on reducing obtained scores by 20% or more. 3-Participate in gradual imaginal and/or live exposure to the feared negative consequences predicted by worries and develop reality-based predictions. Methods/Interventions: 1-Ask the patient to describe past experiences of anxiety and the impact of these experiences on functioning via interview. 2-Administer psychological objective measures to help assess the nature and degree of the patient's worry and anxiety and impact on functioning. 3-Direct and assist the patient in constructing a hierarchy of feared or avoided activities and/or thoughts followed by imaginal and/or in vivo exposure to feared situations and/or thoughts.  Treatment Plan for Depression: Goal: Develop healthy thinking patterns and beliefs about self, others and the world that lead to alleviation of depressive symptoms and help prevent relapse of depression. Objectives: 1- Verbalize any history of past and present suicidal ideation and/or intent. 2- Identify and replace thoughts and beliefs that support depression 3- Complete psychological instruments designed to assess depressive symptoms with a focus on reducing obtained scores by 20% or more. Methods: 1-Establish rapport with the patient toward building a strong therapeutic alliance, convey caring, support, warmth and empathy in a safe, non-judgmental environment. 2-Conduct cognitive behavioral therapy, first conveying the connection among cognition, depressive feelings and actions. 3- Administer psychological objective measures to help assess the nature and degree of the patient's depression and impact on functioning. PTSD Goal Eliminate or reduce the negative impact trauma- related symptoms have on social, occupational, and family functioning.  Objectives:  1-Describe in as much detail as comfort allows the experience( s) with trauma and history of PTSD symptoms.  2-Identify, challenge, and replace biased, negative, and self- defeating thoughts resulting from the trauma with reality- based alternatives.  3-Learn and implement guided self- dialog to manage thoughts, feelings, and urges brought on by encounters with trauma- related situations.  Methods:  1-Gently and sensitively explore the client's recollection of the facts of the traumatic incident( s) and his/ her/ their posttraumatic reactions; assess frequency, intensity, duration, and history of the client's PTSD symptoms and their impact on functioning.  2-Assign the client to keep a daily log of automatic thoughts and feelings (or supplement with "Negative Thoughts Trigger Negative Feelings" in the Adult Psychotherapy Homework Planner by Joseph); process the journal material to identify trauma- driven patterns and reality- based alternatives facilitative of posttraumatic growth.  3-Teach the client a guided self- dialogue procedure in which he/ she/ they learn to recognize maladaptive self- talk, challenge its biases, cope with engendered feelings, overcome avoidance, and reinforce accomplishments; review and reinforce progress, problem- solve obstacles toward a sustained goal- oriented approach to daily activities Psychotherapy: Every 1 week(s)   Types: Individual Therapy

## 2025-04-01 NOTE — PHYSICAL EXAM
[Unkept] : unkept [Stereotyped/Peculiar] : stereotyped/peculiar [Cooperative] : cooperative [Anxious] : anxious [Constricted] : constricted [Rapid] : rapid [Tangential] : tangential [Flight of ideas] : flight of ideas [Preoccupations/Ruminations] : preoccupations/ruminations [None Reported] : none reported [Average] : average [WNL] : within normal limits [0] : 0 - Not present [3] : 3 - Moderate [1] : 1 - Mild [2] : 2 - Consistently feels "hopeless" but accepts reassurances [de-identified] : somatic [de-identified] : Patient present with tangential speech along with reported and observed anxious  mood.  dystonic movements and complaints of pain. [FreeTextEntry1] : 24
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
## 2025-04-01 NOTE — REASON FOR VISIT
[Patient preference] : as per patient preference [Telehealth (audio & video) - Individual/Group] : This visit was provided via telehealth using real-time 2-way audio visual technology. [Other Location: e.g. Home (Enter Location, City,State)___] : The provider was located at [unfilled]. [Home] : The patient, [unfilled], was located at home, [unfilled], at the time of the visit. [Patient] : Patient [FreeTextEntry4] : 12:00 PM [FreeTextEntry5] : 12:40 PM [FreeTextEntry1] :  Patient reports that he is experiencing symptoms consistent with a Dx of PTSD including, but not limited to, re-experiencing, avoidance, panic, hypervigilance, transient paranoia, and depression. Previously the patient was diagnosed with what he refers to as "raging ADHD."

## 2025-04-01 NOTE — RISK ASSESSMENT
[No, patient denies ideation or behavior] : No, patient denies ideation or behavior [Low acute suicide risk] : Low acute suicide risk [Not clinically indicated] : Safety Plan completed/updated (for individuals at risk): Not clinically indicated [FreeTextEntry9] : patient presents with low risk for suicidal/homicidal ideation and/or intent. [FreeTextEntry3] : Patient advised that should thoughts of suicide or aggression emerge he should contact [facility], call [phone] or present to nearest ED or urgicenter

## 2025-04-08 NOTE — RISK ASSESSMENT
[No, patient denies ideation or behavior] : No, patient denies ideation or behavior [Low acute suicide risk] : Low acute suicide risk [Not clinically indicated] : Safety Plan completed/updated (for individuals at risk): Not clinically indicated [FreeTextEntry9] : patient presents with low risk for suicidal/homicidal ideation and/or intent. [FreeTextEntry3] : Patient advised that should thoughts of suicide or aggression emerge he should contact [facility], call [phone] or present to nearest ED or urgicenter
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
## 2025-04-08 NOTE — PLAN
[Recommended Frequency of Visits: ____] : Recommended frequency of visits: [unfilled] [Return in ____ week(s)] : Return in [unfilled] week(s) [FreeTextEntry2] : Treatment Plan for Anxiety Goal: Reduce overall frequency, intensity, and duration of the anxiety (excessive worry, motor tension, rituals, obsessive rumination, autonomic hyperactivity and/or hypervigilance) so that daily functioning is not impaired. Objectives: 1-Describe situations, thoughts, feelings and actions associated with anxiety and worry. 2-Complete psychological instruments designed to assess worry and anxiety symptoms with a focus on reducing obtained scores by 20% or more. 3-Participate in gradual imaginal and/or live exposure to the feared negative consequences predicted by worries and develop reality-based predictions. Methods/Interventions: 1-Ask the patient to describe past experiences of anxiety and the impact of these experiences on functioning via interview. 2-Administer psychological objective measures to help assess the nature and degree of the patient's worry and anxiety and impact on functioning. 3-Direct and assist the patient in constructing a hierarchy of feared or avoided activities and/or thoughts followed by imaginal and/or in vivo exposure to feared situations and/or thoughts.  Treatment Plan for Depression: Goal: Develop healthy thinking patterns and beliefs about self, others and the world that lead to alleviation of depressive symptoms and help prevent relapse of depression. Objectives: 1- Verbalize any history of past and present suicidal ideation and/or intent. 2- Identify and replace thoughts and beliefs that support depression 3- Complete psychological instruments designed to assess depressive symptoms with a focus on reducing obtained scores by 20% or more. Methods: 1-Establish rapport with the patient toward building a strong therapeutic alliance, convey caring, support, warmth and empathy in a safe, non-judgmental environment. 2-Conduct cognitive behavioral therapy, first conveying the connection among cognition, depressive feelings and actions. 3- Administer psychological objective measures to help assess the nature and degree of the patient's depression and impact on functioning. PTSD Goal Eliminate or reduce the negative impact trauma- related symptoms have on social, occupational, and family functioning.  Objectives:  1-Describe in as much detail as comfort allows the experience( s) with trauma and history of PTSD symptoms.  2-Identify, challenge, and replace biased, negative, and self- defeating thoughts resulting from the trauma with reality- based alternatives.  3-Learn and implement guided self- dialog to manage thoughts, feelings, and urges brought on by encounters with trauma- related situations.  Methods:  1-Gently and sensitively explore the client's recollection of the facts of the traumatic incident( s) and his/ her/ their posttraumatic reactions; assess frequency, intensity, duration, and history of the client's PTSD symptoms and their impact on functioning.  2-Assign the client to keep a daily log of automatic thoughts and feelings (or supplement with "Negative Thoughts Trigger Negative Feelings" in the Adult Psychotherapy Homework Planner by Joseph); process the journal material to identify trauma- driven patterns and reality- based alternatives facilitative of posttraumatic growth.  3-Teach the client a guided self- dialogue procedure in which he/ she/ they learn to recognize maladaptive self- talk, challenge its biases, cope with engendered feelings, overcome avoidance, and reinforce accomplishments; review and reinforce progress, problem- solve obstacles toward a sustained goal- oriented approach to daily activities Psychotherapy: Every 1 week(s)   Types: Individual Therapy [Cognitive Processing Therapy] : Cognitive Processing Therapy  [de-identified] : Patient seen in session. Somewhat improved in terms of intensity of somatic pre-occupations and dystonic movements.  Patient has been cleared to return to work with little clarification of the "process."  Reports continued anhedonia.  Session spent exploring developmental factors with the patient endorsing having experienced separation anxiety and growing up in a home described as "inconsistent."  He attributes this to both parents misuse of alcohol; feeling that "my parent's good moods were always contingent."  Patient struggling with defining his identity. [FreeTextEntry1] : Treatment Plan for Anxiety Goal: Reduce overall frequency, intensity, and duration of the anxiety (excessive worry, motor tension, rituals, obsessive rumination, autonomic hyperactivity and/or hypervigilance) so that daily functioning is not impaired. Objectives: 1-Describe situations, thoughts, feelings and actions associated with anxiety and worry. 2-Complete psychological instruments designed to assess worry and anxiety symptoms with a focus on reducing obtained scores by 20% or more. 3-Participate in gradual imaginal and/or live exposure to the feared negative consequences predicted by worries and develop reality-based predictions. Methods/Interventions: 1-Ask the patient to describe past experiences of anxiety and the impact of these experiences on functioning via interview. 2-Administer psychological objective measures to help assess the nature and degree of the patient's worry and anxiety and impact on functioning. 3-Direct and assist the patient in constructing a hierarchy of feared or avoided activities and/or thoughts followed by imaginal and/or in vivo exposure to feared situations and/or thoughts.  Treatment Plan for Depression: Goal: Develop healthy thinking patterns and beliefs about self, others and the world that lead to alleviation of depressive symptoms and help prevent relapse of depression. Objectives: 1- Verbalize any history of past and present suicidal ideation and/or intent. 2- Identify and replace thoughts and beliefs that support depression 3- Complete psychological instruments designed to assess depressive symptoms with a focus on reducing obtained scores by 20% or more. Methods: 1-Establish rapport with the patient toward building a strong therapeutic alliance, convey caring, support, warmth and empathy in a safe, non-judgmental environment. 2-Conduct cognitive behavioral therapy, first conveying the connection among cognition, depressive feelings and actions. 3- Administer psychological objective measures to help assess the nature and degree of the patient's depression and impact on functioning. PTSD Goal Eliminate or reduce the negative impact trauma- related symptoms have on social, occupational, and family functioning.  Objectives:  1-Describe in as much detail as comfort allows the experience( s) with trauma and history of PTSD symptoms.  2-Identify, challenge, and replace biased, negative, and self- defeating thoughts resulting from the trauma with reality- based alternatives.  3-Learn and implement guided self- dialog to manage thoughts, feelings, and urges brought on by encounters with trauma- related situations.  Methods:  1-Gently and sensitively explore the client's recollection of the facts of the traumatic incident( s) and his/ her/ their posttraumatic reactions; assess frequency, intensity, duration, and history of the client's PTSD symptoms and their impact on functioning.  2-Assign the client to keep a daily log of automatic thoughts and feelings (or supplement with "Negative Thoughts Trigger Negative Feelings" in the Adult Psychotherapy Homework Planner by Joseph); process the journal material to identify trauma- driven patterns and reality- based alternatives facilitative of posttraumatic growth.  3-Teach the client a guided self- dialogue procedure in which he/ she/ they learn to recognize maladaptive self- talk, challenge its biases, cope with engendered feelings, overcome avoidance, and reinforce accomplishments; review and reinforce progress, problem- solve obstacles toward a sustained goal- oriented approach to daily activities Psychotherapy: Every 1 week(s)   Types: Individual Therapy

## 2025-04-08 NOTE — REASON FOR VISIT
[Patient preference] : as per patient preference [Telehealth (audio & video) - Individual/Group] : This visit was provided via telehealth using real-time 2-way audio visual technology. [Other Location: e.g. Home (Enter Location, City,State)___] : The provider was located at [unfilled]. [Home] : The patient, [unfilled], was located at home, [unfilled], at the time of the visit. [Patient] : Patient [FreeTextEntry4] : 12:00 PM [FreeTextEntry5] : 12:50 PM [FreeTextEntry1] :  Patient reports that he is experiencing symptoms consistent with a Dx of PTSD including, but not limited to, re-experiencing, avoidance, panic, hypervigilance, transient paranoia, and depression. Previously the patient was diagnosed with what he refers to as "raging ADHD."

## 2025-04-08 NOTE — PHYSICAL EXAM
[Cooperative] : cooperative [Anxious] : anxious [Constricted] : constricted [Flight of ideas] : flight of ideas [Preoccupations/Ruminations] : preoccupations/ruminations [None Reported] : none reported [Average] : average [3] : 3 - Moderate [1] : 1 - Mild [0] : 0 - Not present [Well groomed] : well groomed [WNL] : within normal limits [Overproductive] : overproductive [Circumstantial] : circumstantial [Tangential] : tangential [2] : 2 - Wringing hands, pulling hair, picking at hands or clothes. Restless on the charles, some pacing [de-identified] : somatic [de-identified] : Patient present with tangential/circumstantial speech along with reported and observed anxious mood.  [FreeTextEntry1] : 23

## 2025-04-08 NOTE — END OF VISIT
[Duration of Psychotherapy Visit (minutes spent in synchronous communication): ____] : Duration of Psychotherapy Visit (minutes spent in synchronous communication): [unfilled] [Individual Psychotherapy for 38-52 minutes] : Individual Psychotherapy for 38 - 52 minutes [Teletherapy Service Provided] : The services provided in this session were delivered via tele-therapy [FreeTextEntry3] : Home [FreeTextEntry5] : [place], [place] [Licensed Clinician] : Licensed Clinician
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
## 2025-04-18 NOTE — RISK ASSESSMENT
[No, patient denies ideation or behavior] : No, patient denies ideation or behavior [Low acute suicide risk] : Low acute suicide risk [Not clinically indicated] : Safety Plan completed/updated (for individuals at risk): Not clinically indicated [FreeTextEntry9] : patient presents with low risk for suicidal/homicidal ideation and/or intent. [FreeTextEntry3] : Patient advised that should thoughts of suicide or aggression emerge he should contact [facility], call [phone] or present to nearest ED or urgicenter

## 2025-04-18 NOTE — PLAN
[Recommended Frequency of Visits: ____] : Recommended frequency of visits: [unfilled] [Return in ____ week(s)] : Return in [unfilled] week(s) [FreeTextEntry2] : Treatment Plan for Anxiety Goal: Reduce overall frequency, intensity, and duration of the anxiety (excessive worry, motor tension, rituals, obsessive rumination, autonomic hyperactivity and/or hypervigilance) so that daily functioning is not impaired. Objectives: 1-Describe situations, thoughts, feelings and actions associated with anxiety and worry. 2-Complete psychological instruments designed to assess worry and anxiety symptoms with a focus on reducing obtained scores by 20% or more. 3-Participate in gradual imaginal and/or live exposure to the feared negative consequences predicted by worries and develop reality-based predictions. Methods/Interventions: 1-Ask the patient to describe past experiences of anxiety and the impact of these experiences on functioning via interview. 2-Administer psychological objective measures to help assess the nature and degree of the patient's worry and anxiety and impact on functioning. 3-Direct and assist the patient in constructing a hierarchy of feared or avoided activities and/or thoughts followed by imaginal and/or in vivo exposure to feared situations and/or thoughts.  Treatment Plan for Depression: Goal: Develop healthy thinking patterns and beliefs about self, others and the world that lead to alleviation of depressive symptoms and help prevent relapse of depression. Objectives: 1- Verbalize any history of past and present suicidal ideation and/or intent. 2- Identify and replace thoughts and beliefs that support depression 3- Complete psychological instruments designed to assess depressive symptoms with a focus on reducing obtained scores by 20% or more. Methods: 1-Establish rapport with the patient toward building a strong therapeutic alliance, convey caring, support, warmth and empathy in a safe, non-judgmental environment. 2-Conduct cognitive behavioral therapy, first conveying the connection among cognition, depressive feelings and actions. 3- Administer psychological objective measures to help assess the nature and degree of the patient's depression and impact on functioning. PTSD Goal Eliminate or reduce the negative impact trauma- related symptoms have on social, occupational, and family functioning.  Objectives:  1-Describe in as much detail as comfort allows the experience( s) with trauma and history of PTSD symptoms.  2-Identify, challenge, and replace biased, negative, and self- defeating thoughts resulting from the trauma with reality- based alternatives.  3-Learn and implement guided self- dialog to manage thoughts, feelings, and urges brought on by encounters with trauma- related situations.  Methods:  1-Gently and sensitively explore the client's recollection of the facts of the traumatic incident( s) and his/ her/ their posttraumatic reactions; assess frequency, intensity, duration, and history of the client's PTSD symptoms and their impact on functioning.  2-Assign the client to keep a daily log of automatic thoughts and feelings (or supplement with "Negative Thoughts Trigger Negative Feelings" in the Adult Psychotherapy Homework Planner by Joseph); process the journal material to identify trauma- driven patterns and reality- based alternatives facilitative of posttraumatic growth.  3-Teach the client a guided self- dialogue procedure in which he/ she/ they learn to recognize maladaptive self- talk, challenge its biases, cope with engendered feelings, overcome avoidance, and reinforce accomplishments; review and reinforce progress, problem- solve obstacles toward a sustained goal- oriented approach to daily activities Psychotherapy: Every 1 week(s)   Types: Individual Therapy [Cognitive and/or Behavior Therapy] : Cognitive and/or Behavior Therapy  [Cognitive Processing Therapy] : Cognitive Processing Therapy  [de-identified] :  He returned to work this past Monday without incident.  He is dissatisfied with work and is considering seeking other employment but feels he will be disloyal to his grandmother who helped him secure the position.  The patient discussed a desire to increase his social network and specifically "get a girlfriend" and will look into "[employer]" lev as a venue.  Patient continued to review developmental factors, endorsing critical rearing ("I still can't do anything without them criticizing."  "Nothing I do would be the way they would do it."  Patient reports he was often compared to an uncle who was a substance abuser.   [FreeTextEntry1] : Treatment Plan for Anxiety Goal: Reduce overall frequency, intensity, and duration of the anxiety (excessive worry, motor tension, rituals, obsessive rumination, autonomic hyperactivity and/or hypervigilance) so that daily functioning is not impaired. Objectives: 1-Describe situations, thoughts, feelings and actions associated with anxiety and worry. 2-Complete psychological instruments designed to assess worry and anxiety symptoms with a focus on reducing obtained scores by 20% or more. 3-Participate in gradual imaginal and/or live exposure to the feared negative consequences predicted by worries and develop reality-based predictions. Methods/Interventions: 1-Ask the patient to describe past experiences of anxiety and the impact of these experiences on functioning via interview. 2-Administer psychological objective measures to help assess the nature and degree of the patient's worry and anxiety and impact on functioning. 3-Direct and assist the patient in constructing a hierarchy of feared or avoided activities and/or thoughts followed by imaginal and/or in vivo exposure to feared situations and/or thoughts.  Treatment Plan for Depression: Goal: Develop healthy thinking patterns and beliefs about self, others and the world that lead to alleviation of depressive symptoms and help prevent relapse of depression. Objectives: 1- Verbalize any history of past and present suicidal ideation and/or intent. 2- Identify and replace thoughts and beliefs that support depression 3- Complete psychological instruments designed to assess depressive symptoms with a focus on reducing obtained scores by 20% or more. Methods: 1-Establish rapport with the patient toward building a strong therapeutic alliance, convey caring, support, warmth and empathy in a safe, non-judgmental environment. 2-Conduct cognitive behavioral therapy, first conveying the connection among cognition, depressive feelings and actions. 3- Administer psychological objective measures to help assess the nature and degree of the patient's depression and impact on functioning. PTSD Goal Eliminate or reduce the negative impact trauma- related symptoms have on social, occupational, and family functioning.  Objectives:  1-Describe in as much detail as comfort allows the experience( s) with trauma and history of PTSD symptoms.  2-Identify, challenge, and replace biased, negative, and self- defeating thoughts resulting from the trauma with reality- based alternatives.  3-Learn and implement guided self- dialog to manage thoughts, feelings, and urges brought on by encounters with trauma- related situations.  Methods:  1-Gently and sensitively explore the client's recollection of the facts of the traumatic incident( s) and his/ her/ their posttraumatic reactions; assess frequency, intensity, duration, and history of the client's PTSD symptoms and their impact on functioning.  2-Assign the client to keep a daily log of automatic thoughts and feelings (or supplement with "Negative Thoughts Trigger Negative Feelings" in the Adult Psychotherapy Homework Planner by Joseph); process the journal material to identify trauma- driven patterns and reality- based alternatives facilitative of posttraumatic growth.  3-Teach the client a guided self- dialogue procedure in which he/ she/ they learn to recognize maladaptive self- talk, challenge its biases, cope with engendered feelings, overcome avoidance, and reinforce accomplishments; review and reinforce progress, problem- solve obstacles toward a sustained goal- oriented approach to daily activities Psychotherapy: Every 1 week(s)   Types: Individual Therapy

## 2025-04-18 NOTE — REASON FOR VISIT
[Patient preference] : as per patient preference [Telehealth (audio & video) - Individual/Group] : This visit was provided via telehealth using real-time 2-way audio visual technology. [Medical Office: ([facility])___] : The provider was located at the medical office in [unfilled]. [Home] : The patient, [unfilled], was located at home, [unfilled], at the time of the visit. [Patient] : Patient [FreeTextEntry4] : 10:00 AM [FreeTextEntry5] : 10:45 AM [FreeTextEntry1] :  Patient reports that he is experiencing symptoms consistent with a Dx of PTSD including, but not limited to, re-experiencing, avoidance, panic, hypervigilance, transient paranoia, and depression. Previously the patient was diagnosed with what he refers to as "raging ADHD."

## 2025-04-18 NOTE — PHYSICAL EXAM
[Cooperative] : cooperative [Anxious] : anxious [Constricted] : constricted [Overproductive] : overproductive [Circumstantial] : circumstantial [Flight of ideas] : flight of ideas [Preoccupations/Ruminations] : preoccupations/ruminations [None Reported] : none reported [Average] : average [WNL] : within normal limits [3] : 3 - Moderate [1] : 1 - Mild [2] : 2 - Consistently feels "hopeless" but accepts reassurances [0] : 0 - Not present [Well groomed] : well groomed [de-identified] : somatic [de-identified] : Patient present with circumstantial speech along with reported and observed anxious mood. [FreeTextEntry1] : 23

## 2025-05-28 NOTE — DISCUSSION/SUMMARY
[FreeTextEntry1] : Attempts to contact patient via phone, texting and 10-day letter, none of which have been successful and all have gone unanswered.  Thus, the patient is no longer considered receiving active treatment.